# Patient Record
Sex: MALE | Race: WHITE | URBAN - METROPOLITAN AREA
[De-identification: names, ages, dates, MRNs, and addresses within clinical notes are randomized per-mention and may not be internally consistent; named-entity substitution may affect disease eponyms.]

---

## 2024-06-20 NOTE — PROGRESS NOTES
Patient ___ reached   __X___not reached-preop instructions left on voice bpnp__[phone]___________      DATE_7/2/24_______ TIME__1400_____ARRIVAL___1230  FEC______      Nothing to eat or drink after midnight the night before,except for what the prep instructions call for.If you do not have the instructions or do not understand them please contact your doctors office.    Follow any instructions your doctors office has given you including what medications to take the AM of your procedure and which ones to hold.You may use your inhalers - bring rescue inhalers with you DOS.If you take a long acting insulin the galindo prior please cut the dose in half and take no diabetic medications that AM.Follow specific doctors office instructions regarding blood thinners and if they want you to hold and for how long. If you are on a blood thinner and have no instructions please contact the office and ask.    Dress comfortably,bring your insurance card,picture ID,and a complete list of medications, including supplements.    You must have a responsible adult to stay with you during the procedure,drive you home and stay with you.    [facility] phone number [phone] for any questions.      OTHER INSTRUCTIONS(if applicable)_________________________________________________________        VISITOR POLICY(subject to change)    Current visitor policy is 2 visitors per patient.No children allowed. Mask at discretion of facility.  Visiting hours are 8a-8p. Overnight visitors will be at the discretion of the nurse. All policies are subject to change.

## 2024-07-02 ENCOUNTER — ANESTHESIA EVENT (OUTPATIENT)
Dept: ENDOSCOPY | Age: 64
End: 2024-07-02
Payer: COMMERCIAL

## 2024-07-02 ENCOUNTER — ANESTHESIA (OUTPATIENT)
Dept: ENDOSCOPY | Age: 64
End: 2024-07-02
Payer: COMMERCIAL

## 2024-07-02 ENCOUNTER — HOSPITAL ENCOUNTER (OUTPATIENT)
Age: 64
Setting detail: OUTPATIENT SURGERY
Discharge: HOME | End: 2024-07-02
Attending: INTERNAL MEDICINE | Admitting: INTERNAL MEDICINE
Payer: COMMERCIAL

## 2024-07-02 VITALS
OXYGEN SATURATION: 100 % | TEMPERATURE: 98 F | DIASTOLIC BLOOD PRESSURE: 76 MMHG | SYSTOLIC BLOOD PRESSURE: 123 MMHG | BODY MASS INDEX: 22.48 KG/M2 | RESPIRATION RATE: 19 BRPM | WEIGHT: 157 LBS | HEART RATE: 67 BPM | HEIGHT: 70 IN

## 2024-07-02 PROCEDURE — 6360000002 HC RX W HCPCS: Performed by: NURSE ANESTHETIST, CERTIFIED REGISTERED

## 2024-07-02 PROCEDURE — 2709999900 HC NON-CHARGEABLE SUPPLY: Performed by: INTERNAL MEDICINE

## 2024-07-02 PROCEDURE — 7100000010 HC PHASE II RECOVERY - FIRST 15 MIN: Performed by: INTERNAL MEDICINE

## 2024-07-02 PROCEDURE — 2580000003 HC RX 258: Performed by: NURSE ANESTHETIST, CERTIFIED REGISTERED

## 2024-07-02 PROCEDURE — 3700000001 HC ADD 15 MINUTES (ANESTHESIA): Performed by: INTERNAL MEDICINE

## 2024-07-02 PROCEDURE — 7100000011 HC PHASE II RECOVERY - ADDTL 15 MIN: Performed by: INTERNAL MEDICINE

## 2024-07-02 PROCEDURE — 3700000000 HC ANESTHESIA ATTENDED CARE: Performed by: INTERNAL MEDICINE

## 2024-07-02 PROCEDURE — 2500000003 HC RX 250 WO HCPCS: Performed by: NURSE ANESTHETIST, CERTIFIED REGISTERED

## 2024-07-02 PROCEDURE — 3609027000 HC COLONOSCOPY: Performed by: INTERNAL MEDICINE

## 2024-07-02 PROCEDURE — 2580000003 HC RX 258: Performed by: ANESTHESIOLOGY

## 2024-07-02 RX ORDER — PROPOFOL 10 MG/ML
INJECTION, EMULSION INTRAVENOUS PRN
Status: DISCONTINUED | OUTPATIENT
Start: 2024-07-02 | End: 2024-07-02 | Stop reason: SDUPTHER

## 2024-07-02 RX ORDER — LIDOCAINE HYDROCHLORIDE 20 MG/ML
INJECTION, SOLUTION EPIDURAL; INFILTRATION; INTRACAUDAL; PERINEURAL PRN
Status: DISCONTINUED | OUTPATIENT
Start: 2024-07-02 | End: 2024-07-02 | Stop reason: SDUPTHER

## 2024-07-02 RX ORDER — SODIUM CHLORIDE 9 MG/ML
INJECTION, SOLUTION INTRAVENOUS CONTINUOUS PRN
Status: DISCONTINUED | OUTPATIENT
Start: 2024-07-02 | End: 2024-07-02 | Stop reason: SDUPTHER

## 2024-07-02 RX ORDER — SODIUM CHLORIDE 9 MG/ML
INJECTION, SOLUTION INTRAVENOUS CONTINUOUS
Status: DISCONTINUED | OUTPATIENT
Start: 2024-07-02 | End: 2024-07-02 | Stop reason: HOSPADM

## 2024-07-02 RX ADMIN — PROPOFOL 50 MG: 10 INJECTION, EMULSION INTRAVENOUS at 14:09

## 2024-07-02 RX ADMIN — SODIUM CHLORIDE: 9 INJECTION, SOLUTION INTRAVENOUS at 13:02

## 2024-07-02 RX ADMIN — PHENYLEPHRINE HYDROCHLORIDE 50 MCG: 10 INJECTION INTRAVENOUS at 14:26

## 2024-07-02 RX ADMIN — PHENYLEPHRINE HYDROCHLORIDE 100 MCG: 10 INJECTION INTRAVENOUS at 14:19

## 2024-07-02 RX ADMIN — PHENYLEPHRINE HYDROCHLORIDE 50 MCG: 10 INJECTION INTRAVENOUS at 14:32

## 2024-07-02 RX ADMIN — PROPOFOL 120 MCG/KG/MIN: 10 INJECTION, EMULSION INTRAVENOUS at 14:17

## 2024-07-02 RX ADMIN — SODIUM CHLORIDE: 9 INJECTION, SOLUTION INTRAVENOUS at 14:02

## 2024-07-02 RX ADMIN — LIDOCAINE HYDROCHLORIDE 100 MG: 20 INJECTION, SOLUTION EPIDURAL; INFILTRATION; INTRACAUDAL; PERINEURAL at 14:06

## 2024-07-02 RX ADMIN — PROPOFOL 50 MG: 10 INJECTION, EMULSION INTRAVENOUS at 14:06

## 2024-07-02 RX ADMIN — PROPOFOL 50 MG: 10 INJECTION, EMULSION INTRAVENOUS at 14:13

## 2024-07-02 ASSESSMENT — PAIN - FUNCTIONAL ASSESSMENT: PAIN_FUNCTIONAL_ASSESSMENT: NONE - DENIES PAIN

## 2024-07-02 ASSESSMENT — LIFESTYLE VARIABLES: SMOKING_STATUS: 0

## 2024-07-02 NOTE — PROGRESS NOTES
Post-procedure education reviewed with patient and visitor, including resuming of medication regimen, and they verbalized understanding.

## 2024-07-02 NOTE — ANESTHESIA POSTPROCEDURE EVALUATION
Department of Anesthesiology  Postprocedure Note    Patient: Reji Piper  MRN: [id number]  YOB: 1960  Date of evaluation: 7/2/2024    Procedure Summary       Date: 07/02/24 Room / Location: [place] [address] / [facility]    Anesthesia Start: 1404 Anesthesia Stop: 1435    Procedure: COLONOSCOPY DIAGNOSTIC Diagnosis:       Colon cancer screening      (Colon cancer screening [Z12.11])    Surgeons: Chilo Jimneez MD Responsible Provider: Royer Costello MD    Anesthesia Type: MAC ASA Status: 1            Anesthesia Type: No value filed.    Devi Phase I: Devi Score: 10    Dvei Phase II:      Anesthesia Post Evaluation    Patient location during evaluation: PACU  Patient participation: complete - patient participated  Level of consciousness: awake and alert  Airway patency: patent  Nausea & Vomiting: no vomiting and no nausea  Cardiovascular status: hemodynamically stable  Respiratory status: acceptable  Hydration status: stable  Pain management: adequate    No notable events documented.

## 2024-07-02 NOTE — H&P
Gastroenterology Note                 Pre-operative History and Physical    Patient: Reji Piper  : 1960  CSN:     History Obtained From:   Patient or guardian.      HISTORY OF PRESENT ILLNESS:    The patient is a 63 y.o. male here for Endoscopy.      Past Medical History:    History reviewed. No pertinent past medical history.  Past Surgical History:    Past Surgical History:   Procedure Laterality Date    COLONOSCOPY      EYE SURGERY       Medications Prior to Admission:   No current facility-administered medications on file prior to encounter.     Current Outpatient Medications on File Prior to Encounter   Medication Sig Dispense Refill    Lysine 500 MG CAPS Take by mouth          Allergies:  Codeine      Social History:   Social History     Tobacco Use    Smoking status: Never    Smokeless tobacco: Never   Substance Use Topics    Alcohol use: Never     Family History:   History reviewed. No pertinent family history.    PHYSICAL EXAM:      /71   Pulse 75   Temp 97.2 °F (36.2 °C) (Temporal)   Resp (!) 118   Ht 1.765 m (5' 9.5\")   Wt 71.2 kg (157 lb)   SpO2 100%   BMI 22.85 kg/m²  I        Heart:  RRR, normal s1s2    Lungs:  CTA and normal effort    Abdomen:   Soft, nt nd.         ASSESSMENT AND PLAN:    1.  Patient is a 63 y.o. male here for endoscopy with MAC sedation.    2.  Procedure options, risks and benefits reviewed with patient and/or guardian.  They express understanding.

## 2024-07-02 NOTE — DISCHARGE INSTRUCTIONS
COLONSCOPY DISCHARGE INSTRUCTIONS    You may experience some lightheadedness for the next several hours.  Plan on quiet relaxation for the rest of today. Nap for four hours following procedure if possible.  A responsible adult needs to stay with you today.    Eat bland food and avoid anything greasy or spicy initially-progress to your normal diet gradually.  Diet restrictions as instructed.  You may resume home medications as instructed.    It is not unusual to experience some mild cramping or gas pains, and you may not have a bowel movement for several days.  If you had a polyp removed, avoid strenuous activity for 48 hours.  Avoid the use of aspirin or related compounds for one week, unless otherwise instructed by your physician.    You may notice a small amount of blood in your next few bowel movements, but if a large amount passes, call your physician.    If you have any of the following problems, notify your physician or return to the hospital emergency room : fever, chills, excessive bleeding, excessive vomiting, difficulty swallowing, uncontrolled pain, increased abdominal distention, shortness of breath or any other problems.    Call your doctor at [phone] if you have any concerns.     If you had any biopsies or polyps call for results in 14 business days.    See your physician's report for details about your procedure and recommendations.      ANESTHESIA DISCHARGE INSTRUCTIONS    Wear your seatbelt home.    You are under the influence of drugs-do not drink alcohol, drive ,operate machinery,or make any important decisions or sign any legal documentsfor 24 hours. You may resume normal activities tomorrow.  A responsible adult needs to be with you for 24 hours.  You may experience lightheadedness,dizziness,or sleepiness following surgery.    Rest at home today- increase activity as tolerated. It is recommended to take a four hour nap after procedure.  Progress slowly to a regular diet unless your

## (undated) DEVICE — BW-412T DISP COMBO CLEANING BRUSH: Brand: SINGLE USE COMBINATION CLEANING BRUSH

## (undated) DEVICE — AIR/WATER CLEANING ADAPTER FOR OLYMPUS® GI ENDOSCOPE: Brand: BULLDOG®

## (undated) DEVICE — ENDOSCOPIC KIT 6X3/16 FT COLON W/ 1.1 OZ 2 GWN W/O BRSH

## (undated) DEVICE — SOLUTION IRRIG 500ML STRL H2O NONPYROGENIC

## (undated) DEVICE — SINGLE USE AIR/WATER, SUCTION AND BIOPSY VALVES SET: Brand: ORCAPOD™